# Patient Record
Sex: FEMALE | Employment: FULL TIME | URBAN - METROPOLITAN AREA
[De-identification: names, ages, dates, MRNs, and addresses within clinical notes are randomized per-mention and may not be internally consistent; named-entity substitution may affect disease eponyms.]

---

## 2017-07-28 ENCOUNTER — APPOINTMENT (OUTPATIENT)
Dept: LAB | Age: 22
End: 2017-07-28
Attending: INTERNAL MEDICINE
Payer: COMMERCIAL

## 2017-07-28 ENCOUNTER — OFFICE VISIT (OUTPATIENT)
Dept: INTERNAL MEDICINE CLINIC | Facility: CLINIC | Age: 22
End: 2017-07-28

## 2017-07-28 VITALS
HEART RATE: 96 BPM | TEMPERATURE: 98 F | HEIGHT: 59 IN | WEIGHT: 104 LBS | DIASTOLIC BLOOD PRESSURE: 69 MMHG | RESPIRATION RATE: 24 BRPM | SYSTOLIC BLOOD PRESSURE: 108 MMHG | BODY MASS INDEX: 20.96 KG/M2

## 2017-07-28 DIAGNOSIS — E55.9 VITAMIN D DEFICIENCY: ICD-10-CM

## 2017-07-28 DIAGNOSIS — Z11.1 SCREENING-PULMONARY TB: ICD-10-CM

## 2017-07-28 DIAGNOSIS — Z00.00 PHYSICAL EXAM, ANNUAL: Primary | ICD-10-CM

## 2017-07-28 DIAGNOSIS — Z13.9 SCREENING FOR CONDITION: ICD-10-CM

## 2017-07-28 PROCEDURE — 99395 PREV VISIT EST AGE 18-39: CPT | Performed by: INTERNAL MEDICINE

## 2017-07-28 RX ORDER — NORGESTIMATE AND ETHINYL ESTRADIOL 7DAYSX3 28
1 KIT ORAL
COMMUNITY
Start: 2017-03-23 | End: 2018-04-06

## 2017-07-28 NOTE — PROGRESS NOTES
HPI:    Patient ID: Keanu Momin is a 25year old female. HPI  Patient reports that she has been feeling well overall, she is going to attend nursing school, needs titers lab test done. Last Tdap was March 2017.   She had varicella titers done and they we Allergies:No Known Allergies   /69 (BP Location: Right arm, Patient Position: Sitting, Cuff Size: adult)   Pulse 96   Temp 98.1 °F (36.7 °C) (Oral)   Resp 24   Ht 4' 11\" (1.499 m)   Wt 104 lb (47.2 kg)   LMP 07/08/2017   BMI 21.01 kg/m²    Physi Hepatitis B Surface Antibody [E]      Quantiferon TB Gold (in Tube)    Meds This Visit:  No prescriptions requested or ordered in this encounter       Imaging & Referrals:  None         FRANCESCA#[id number]

## 2017-07-29 ENCOUNTER — LAB ENCOUNTER (OUTPATIENT)
Dept: LAB | Age: 22
End: 2017-07-29
Attending: INTERNAL MEDICINE
Payer: COMMERCIAL

## 2017-07-29 DIAGNOSIS — E55.9 VITAMIN D DEFICIENCY: ICD-10-CM

## 2017-07-29 DIAGNOSIS — Z11.1 SCREENING-PULMONARY TB: ICD-10-CM

## 2017-07-29 DIAGNOSIS — Z13.9 SCREENING FOR CONDITION: ICD-10-CM

## 2017-07-29 DIAGNOSIS — Z00.00 PHYSICAL EXAM, ANNUAL: ICD-10-CM

## 2017-07-29 LAB
ALBUMIN SERPL BCP-MCNC: 4 G/DL (ref 3.5–4.8)
ALBUMIN/GLOB SERPL: 1.1 {RATIO} (ref 1–2)
ALP SERPL-CCNC: 31 U/L (ref 32–100)
ALT SERPL-CCNC: 15 U/L (ref 14–54)
ANION GAP SERPL CALC-SCNC: 6 MMOL/L (ref 0–18)
AST SERPL-CCNC: 20 U/L (ref 15–41)
BASOPHILS # BLD: 0 K/UL (ref 0–0.2)
BASOPHILS NFR BLD: 0 %
BILIRUB SERPL-MCNC: 0.6 MG/DL (ref 0.3–1.2)
BUN SERPL-MCNC: 6 MG/DL (ref 8–20)
BUN/CREAT SERPL: 8.3 (ref 10–20)
CALCIUM SERPL-MCNC: 8.9 MG/DL (ref 8.5–10.5)
CHLORIDE SERPL-SCNC: 107 MMOL/L (ref 95–110)
CHOLEST SERPL-MCNC: 226 MG/DL (ref 110–200)
CO2 SERPL-SCNC: 24 MMOL/L (ref 22–32)
CREAT SERPL-MCNC: 0.72 MG/DL (ref 0.5–1.5)
EOSINOPHIL # BLD: 0.3 K/UL (ref 0–0.7)
EOSINOPHIL NFR BLD: 6 %
ERYTHROCYTE [DISTWIDTH] IN BLOOD BY AUTOMATED COUNT: 13.2 % (ref 11–15)
GLOBULIN PLAS-MCNC: 3.6 G/DL (ref 2.5–3.7)
GLUCOSE SERPL-MCNC: 86 MG/DL (ref 70–99)
HCT VFR BLD AUTO: 41.7 % (ref 35–48)
HDLC SERPL-MCNC: 77 MG/DL
HGB BLD-MCNC: 14.1 G/DL (ref 12–16)
LDLC SERPL CALC-MCNC: 126 MG/DL (ref 0–99)
LYMPHOCYTES # BLD: 1.8 K/UL (ref 1–4)
LYMPHOCYTES NFR BLD: 32 %
MCH RBC QN AUTO: 30.4 PG (ref 27–32)
MCHC RBC AUTO-ENTMCNC: 33.9 G/DL (ref 32–37)
MCV RBC AUTO: 89.6 FL (ref 80–100)
MONOCYTES # BLD: 0.5 K/UL (ref 0–1)
MONOCYTES NFR BLD: 8 %
NEUTROPHILS # BLD AUTO: 3.1 K/UL (ref 1.8–7.7)
NEUTROPHILS NFR BLD: 54 %
NONHDLC SERPL-MCNC: 149 MG/DL
OSMOLALITY UR CALC.SUM OF ELEC: 281 MOSM/KG (ref 275–295)
PLATELET # BLD AUTO: 250 K/UL (ref 140–400)
PMV BLD AUTO: 7.9 FL (ref 7.4–10.3)
POTASSIUM SERPL-SCNC: 3.9 MMOL/L (ref 3.3–5.1)
PROT SERPL-MCNC: 7.6 G/DL (ref 5.9–8.4)
RBC # BLD AUTO: 4.65 M/UL (ref 3.7–5.4)
RUBV IGG SER-ACNC: 121.1 IU/ML
SODIUM SERPL-SCNC: 137 MMOL/L (ref 136–144)
TRIGL SERPL-MCNC: 115 MG/DL (ref 1–149)
TSH SERPL-ACNC: 2.44 UIU/ML (ref 0.45–5.33)
WBC # BLD AUTO: 5.7 K/UL (ref 4–11)

## 2017-07-29 PROCEDURE — 80061 LIPID PANEL: CPT

## 2017-07-29 PROCEDURE — 36415 COLL VENOUS BLD VENIPUNCTURE: CPT

## 2017-07-29 PROCEDURE — 82306 VITAMIN D 25 HYDROXY: CPT

## 2017-07-29 PROCEDURE — 85025 COMPLETE CBC W/AUTO DIFF WBC: CPT

## 2017-07-29 PROCEDURE — 86765 RUBEOLA ANTIBODY: CPT

## 2017-07-29 PROCEDURE — 86762 RUBELLA ANTIBODY: CPT

## 2017-07-29 PROCEDURE — 86480 TB TEST CELL IMMUN MEASURE: CPT

## 2017-07-29 PROCEDURE — 84443 ASSAY THYROID STIM HORMONE: CPT

## 2017-07-29 PROCEDURE — 86706 HEP B SURFACE ANTIBODY: CPT

## 2017-07-29 PROCEDURE — 80053 COMPREHEN METABOLIC PANEL: CPT

## 2017-07-29 PROCEDURE — 86735 MUMPS ANTIBODY: CPT

## 2017-07-31 LAB
25(OH)D3 SERPL-MCNC: 13.8 NG/ML
HBV SURFACE AB SER-ACNC: 9.58 MIU/ML (ref ?–10)
HBV SURFACE AG SERPL QL IA: NONREACTIVE
M TB IFN-G CD4+ BCKGRND COR BLD-ACNC: -0 IU/ML
M TB IFN-G CD4+ T-CELLS BLD-ACNC: 0.02 IU/ML
M TB TUBERC IFN-G BLD QL: NEGATIVE
M TB TUBERC IGNF/MITOGEN IGNF CONTROL: 9.09 IU/ML
MEV IGG SER-ACNC: 77.1 AU/ML (ref 30–?)
MUV IGG SER IA-ACNC: 249 AU/ML (ref 11–?)

## 2017-08-03 ENCOUNTER — TELEPHONE (OUTPATIENT)
Dept: INTERNAL MEDICINE CLINIC | Facility: CLINIC | Age: 22
End: 2017-08-03

## 2017-08-03 NOTE — TELEPHONE ENCOUNTER
Patient needs vaccination with hepatitis B, 3 injections, and 0 month, 1 month in 6 months, she can make nursing appointment.

## 2017-08-04 ENCOUNTER — TELEPHONE (OUTPATIENT)
Dept: INTERNAL MEDICINE CLINIC | Facility: CLINIC | Age: 22
End: 2017-08-04

## 2017-08-04 ENCOUNTER — PATIENT MESSAGE (OUTPATIENT)
Dept: INTERNAL MEDICINE CLINIC | Facility: CLINIC | Age: 22
End: 2017-08-04

## 2017-08-04 DIAGNOSIS — E55.9 VITAMIN D DEFICIENCY: Primary | ICD-10-CM

## 2017-08-07 NOTE — TELEPHONE ENCOUNTER
From: Jm Araujo  To: Raine Carvajal MD  Sent: 8/4/2017 12:51 PM CDT  Subject: Non-Urgent Medical Question    Hi Dr. Greg Chen,    I noticed my BUN and BUN/Crea were lower than normal. Should I be concerned about my kidney function?

## 2017-08-15 RX ORDER — ERGOCALCIFEROL (VITAMIN D2) 1250 MCG
50000 CAPSULE ORAL WEEKLY
Qty: 24 CAPSULE | Refills: 0 | Status: SHIPPED | OUTPATIENT
Start: 2017-08-15 | End: 2018-01-24

## 2017-08-15 NOTE — TELEPHONE ENCOUNTER
LMTCB. Transfer to [id number].    ----- Message from Megan Baeza MD sent at 8/4/2017  7:29 AM CDT -----  Call patient if she did not check my chart see my chart comment  Result   HEPATITIS B SURFACE ANTIBODY (Order [id number])   Patient Result Comments     Reid Contreras
Pt calling back, she did see results on mycjudsont, pt asking about prescription for vitamin d. Order sent to pharmacy and lab generated for 6 months.
no

## 2018-02-15 RX ORDER — ERGOCALCIFEROL 1.25 MG/1
CAPSULE ORAL
Qty: 12 CAPSULE | Refills: 0 | OUTPATIENT
Start: 2018-02-15

## 2018-02-15 NOTE — TELEPHONE ENCOUNTER
Please call pt  She  Was advised to have  vit d  Level  Done , order placed in aug 2017  , can place new order  If needed,so we can determine if she needs to be treated with prescription vit D, call pharamcy and discontinue  Drisdol for now

## 2018-02-20 NOTE — TELEPHONE ENCOUNTER
From: Arti Panchal  Sent: 2/19/2018 11:32 PM CST  Subject: Medication Renewal Request    Arti Panchal would like a refill of the following medications:     Tretinoin 0.05 % Apply Externally Cream Chani Baker MD]   Patient Comment: Still using for acne. Norgestim-Eth Estrad Triphasic (TRI-SPRINTEC) 0.18/0.215/0.25 MG-35 MCG Oral Tab Chani Baker MD]   Patient Comment: Was last filled 11/29/17, I have 1 month supply left.     Preferred pharmacy: [address] [place] [address] [address][id number] - [address] [phone], [phone]

## 2018-02-21 RX ORDER — NORGESTIMATE AND ETHINYL ESTRADIOL 7DAYSX3 28
1 KIT ORAL DAILY
Qty: 28 TABLET | Refills: 1 | Status: SHIPPED | OUTPATIENT
Start: 2018-02-21 | End: 2018-04-06

## 2018-02-21 RX ORDER — TRETINOIN 0.5 MG/G
1 CREAM TOPICAL NIGHTLY
Qty: 45 G | Refills: 1 | Status: SHIPPED | OUTPATIENT
Start: 2018-02-21 | End: 2019-01-26

## 2018-02-21 NOTE — TELEPHONE ENCOUNTER
Call pt I refilled  BCP   For 2 months , she needs  f-up visit , it  Was  rxed by another provider  Before, please call her

## 2018-03-23 ENCOUNTER — TELEPHONE (OUTPATIENT)
Dept: INTERNAL MEDICINE CLINIC | Facility: CLINIC | Age: 23
End: 2018-03-23

## 2018-03-23 NOTE — TELEPHONE ENCOUNTER
Pt calling because she wants to know if Dr. Stanton Dale would like for her to get a lipid panel done. Pt states that the last time she had it done they were high.  Please advise

## 2018-03-26 ENCOUNTER — TELEPHONE (OUTPATIENT)
Dept: INTERNAL MEDICINE CLINIC | Facility: CLINIC | Age: 23
End: 2018-03-26

## 2018-03-26 DIAGNOSIS — E78.00 PURE HYPERCHOLESTEROLEMIA: Primary | ICD-10-CM

## 2018-03-27 NOTE — TELEPHONE ENCOUNTER
Called pt. Left message labs ordered , can do fasting per Dr. Christen Wolff. Pt. Can call back with questions.

## 2018-03-27 NOTE — TELEPHONE ENCOUNTER
I PLACED ORDER FOR LIPID PANEL , HER  NUMBERS WERE  FINE, I AM NOT  CONCERNED BUT SHE CAN DO FASTING  LABS

## 2018-03-30 ENCOUNTER — APPOINTMENT (OUTPATIENT)
Dept: LAB | Age: 23
End: 2018-03-30
Attending: INTERNAL MEDICINE
Payer: COMMERCIAL

## 2018-03-30 DIAGNOSIS — E78.00 PURE HYPERCHOLESTEROLEMIA: ICD-10-CM

## 2018-03-30 DIAGNOSIS — E55.9 VITAMIN D DEFICIENCY: ICD-10-CM

## 2018-03-30 PROCEDURE — 36415 COLL VENOUS BLD VENIPUNCTURE: CPT

## 2018-03-30 PROCEDURE — 80061 LIPID PANEL: CPT

## 2018-03-30 PROCEDURE — 82306 VITAMIN D 25 HYDROXY: CPT

## 2018-04-06 ENCOUNTER — OFFICE VISIT (OUTPATIENT)
Dept: INTERNAL MEDICINE CLINIC | Facility: CLINIC | Age: 23
End: 2018-04-06

## 2018-04-06 VITALS
SYSTOLIC BLOOD PRESSURE: 114 MMHG | WEIGHT: 106 LBS | HEART RATE: 71 BPM | HEIGHT: 59 IN | DIASTOLIC BLOOD PRESSURE: 66 MMHG | RESPIRATION RATE: 18 BRPM | BODY MASS INDEX: 21.37 KG/M2

## 2018-04-06 DIAGNOSIS — E55.9 VITAMIN D DEFICIENCY: ICD-10-CM

## 2018-04-06 DIAGNOSIS — S86.912A STRAIN OF LEFT KNEE, INITIAL ENCOUNTER: Primary | ICD-10-CM

## 2018-04-06 PROCEDURE — 99213 OFFICE O/P EST LOW 20 MIN: CPT | Performed by: INTERNAL MEDICINE

## 2018-04-06 PROCEDURE — 99212 OFFICE O/P EST SF 10 MIN: CPT | Performed by: INTERNAL MEDICINE

## 2018-04-06 RX ORDER — GLUCOSAMINE HCL 500 MG
1 TABLET ORAL DAILY
COMMUNITY
End: 2018-10-12

## 2018-04-06 RX ORDER — ERGOCALCIFEROL (VITAMIN D2) 1250 MCG
50000 CAPSULE ORAL WEEKLY
Qty: 30 CAPSULE | Refills: 0 | Status: SHIPPED | OUTPATIENT
Start: 2018-04-06 | End: 2018-12-06

## 2018-04-06 RX ORDER — NORGESTIMATE AND ETHINYL ESTRADIOL 7DAYSX3 28
1 KIT ORAL DAILY
Qty: 28 TABLET | Refills: 5 | Status: SHIPPED | OUTPATIENT
Start: 2018-04-06 | End: 2018-10-19

## 2018-04-08 NOTE — PROGRESS NOTES
HPI:    Patient ID: Kingsley Yuen is a 21year old female. Presents for evaluation of the knee pain    HPI     Patient reports that she has been bothered by  left knee pain localized over the medial aspect of the left kneecap.   Pain is started to subside, no well-developed and well-nourished. No distress. Musculoskeletal: Normal range of motion. She exhibits no effusion. Left knee: She exhibits normal range of motion, no swelling, no deformity and no erythema. Tenderness found.  Medial joint line and l

## 2018-10-05 ENCOUNTER — NURSE TRIAGE (OUTPATIENT)
Dept: OTHER | Age: 23
End: 2018-10-05

## 2018-10-05 NOTE — TELEPHONE ENCOUNTER
Appointment Information   Name: Uma Fuentes MRN: [id number]   Date: 10/5/2018 Status: Can   Appt Time: 4:30 PM Length: 15   Visit Type: EXAM - ESTABLISHED [2844] Copay: $0.00   Provider: Yashira Castro MD Department: [facility] MED   Referral Number:

## 2018-10-05 NOTE — TELEPHONE ENCOUNTER
Action Requested: Summary for Provider     []  Critical Lab, Recommendations Needed  [] Need Additional Advice  []   FYI    []   Need Orders  [] Need Medications Sent to Pharmacy  []  Other     SUMMARY: Patient requesting appt with NK today for persistent

## 2018-10-12 ENCOUNTER — OFFICE VISIT (OUTPATIENT)
Dept: INTERNAL MEDICINE CLINIC | Facility: CLINIC | Age: 23
End: 2018-10-12
Payer: COMMERCIAL

## 2018-10-12 VITALS
SYSTOLIC BLOOD PRESSURE: 112 MMHG | BODY MASS INDEX: 21 KG/M2 | RESPIRATION RATE: 20 BRPM | WEIGHT: 103 LBS | HEART RATE: 80 BPM | DIASTOLIC BLOOD PRESSURE: 70 MMHG

## 2018-10-12 DIAGNOSIS — J01.00 ACUTE MAXILLARY SINUSITIS, RECURRENCE NOT SPECIFIED: Primary | ICD-10-CM

## 2018-10-12 PROCEDURE — 99214 OFFICE O/P EST MOD 30 MIN: CPT | Performed by: INTERNAL MEDICINE

## 2018-10-12 PROCEDURE — 99212 OFFICE O/P EST SF 10 MIN: CPT | Performed by: INTERNAL MEDICINE

## 2018-10-12 RX ORDER — AMOXICILLIN AND CLAVULANATE POTASSIUM 875; 125 MG/1; MG/1
1 TABLET, FILM COATED ORAL 2 TIMES DAILY
Qty: 20 TABLET | Refills: 0 | Status: SHIPPED | OUTPATIENT
Start: 2018-10-12 | End: 2019-02-20 | Stop reason: ALTCHOICE

## 2018-10-12 NOTE — PROGRESS NOTES
HPI:    Patient ID: Carlos Muñoz is a 21year old female.   Presents for evaluation of the headache    HPI  2 weeks ago started to experience right-sided headache which starts from right cheek radiates up to the right temporal and right frontal area, pain is 1 capsule (50,000 Units total) by mouth once a week. Disp: 30 capsule Rfl: 0   Tretinoin 0.05 % External Cream Apply 1 Application topically nightly.  Disp: 45 g Rfl: 1     Allergies:No Known Allergies   /70 (BP Location: Right arm, Patient Position: Loratadine-Pseudoephedrine ER (CLARITIN-D 12 HOUR) 5-120 MG Oral Tablet 12 Hr 40 tablet 0     Sig: Take 1 tablet by mouth 2 (two) times daily.        Imaging & Referrals:  ENT - INTERNAL         EB#[id number]

## 2018-10-19 RX ORDER — NORGESTIMATE AND ETHINYL ESTRADIOL 7DAYSX3 28
KIT ORAL
Qty: 28 TABLET | Refills: 2 | Status: SHIPPED | OUTPATIENT
Start: 2018-10-19 | End: 2018-10-23

## 2018-10-24 RX ORDER — NORGESTIMATE AND ETHINYL ESTRADIOL 7DAYSX3 28
1 KIT ORAL
Qty: 84 TABLET | Refills: 1 | Status: SHIPPED | OUTPATIENT
Start: 2018-10-24 | End: 2019-04-07

## 2019-01-27 NOTE — TELEPHONE ENCOUNTER
Review pended refill request as it does not fall under a protocol.     Last Rx:2-21-18  LOV: 10-12-18

## 2019-02-20 ENCOUNTER — PATIENT MESSAGE (OUTPATIENT)
Dept: INTERNAL MEDICINE CLINIC | Facility: CLINIC | Age: 24
End: 2019-02-20

## 2019-02-20 ENCOUNTER — OFFICE VISIT (OUTPATIENT)
Dept: INTERNAL MEDICINE CLINIC | Facility: CLINIC | Age: 24
End: 2019-02-20
Payer: COMMERCIAL

## 2019-02-20 ENCOUNTER — APPOINTMENT (OUTPATIENT)
Dept: LAB | Age: 24
End: 2019-02-20
Attending: INTERNAL MEDICINE
Payer: COMMERCIAL

## 2019-02-20 VITALS
SYSTOLIC BLOOD PRESSURE: 114 MMHG | HEART RATE: 103 BPM | TEMPERATURE: 100 F | WEIGHT: 105 LBS | BODY MASS INDEX: 21.17 KG/M2 | HEIGHT: 59 IN | OXYGEN SATURATION: 98 % | DIASTOLIC BLOOD PRESSURE: 75 MMHG

## 2019-02-20 DIAGNOSIS — J06.9 UPPER RESPIRATORY TRACT INFECTION, UNSPECIFIED TYPE: ICD-10-CM

## 2019-02-20 DIAGNOSIS — R50.9 FEVER, UNSPECIFIED FEVER CAUSE: ICD-10-CM

## 2019-02-20 DIAGNOSIS — M79.10 MYALGIA: ICD-10-CM

## 2019-02-20 DIAGNOSIS — R50.9 FEVER, UNSPECIFIED FEVER CAUSE: Primary | ICD-10-CM

## 2019-02-20 PROCEDURE — 87581 M.PNEUMON DNA AMP PROBE: CPT

## 2019-02-20 PROCEDURE — 99212 OFFICE O/P EST SF 10 MIN: CPT | Performed by: INTERNAL MEDICINE

## 2019-02-20 PROCEDURE — 87486 CHLMYD PNEUM DNA AMP PROBE: CPT

## 2019-02-20 PROCEDURE — 87633 RESP VIRUS 12-25 TARGETS: CPT

## 2019-02-20 PROCEDURE — 99213 OFFICE O/P EST LOW 20 MIN: CPT | Performed by: INTERNAL MEDICINE

## 2019-02-20 PROCEDURE — 87798 DETECT AGENT NOS DNA AMP: CPT

## 2019-02-20 RX ORDER — AZITHROMYCIN 250 MG/1
TABLET, FILM COATED ORAL
Qty: 6 TABLET | Refills: 0 | Status: SHIPPED | OUTPATIENT
Start: 2019-02-20 | End: 2019-08-13

## 2019-02-20 NOTE — TELEPHONE ENCOUNTER
Patient has appointment for today with Dr. Marilin Delgado at 4:30 at [facility].

## 2019-02-20 NOTE — PROGRESS NOTES
Patient ID: Juan Min is a 25year old female. Patient presents with:  Fever: fever for the past three days body aches, runny nose, non-productive cough started yesterday. Taking tylenol for fever, last took some at noon today.         HISTORY OF PRESENT resource strain: Not on file      Food insecurity:        Worry: Not on file        Inability: Not on file      Transportation needs:        Medical: Not on file        Non-medical: Not on file    Tobacco Use      Smoking status: Never Smoker      [place] PHYSICAL EXAM:      02/20/19  1627   BP: 114/75   Pulse: 103   Temp: 100 °F (37.8 °C)   TempSrc: Oral   SpO2: 98%   Weight: 105 lb (47.6 kg)   Height: 4' 11\" (1.499 m)       Body mass index is 21.21 kg/m².     Physical Exam   Constitutional: She appears PANEL FLU EXPANDED; Future    3. Myalgia  · RESPIRATORY PANEL FLU EXPANDED; Future  · Plenty of fluids. Return if symptoms worsen or fail to improve.     Kaylie Muñoz MD  2/20/2019

## 2019-02-20 NOTE — PATIENT INSTRUCTIONS
1.  Take antibiotics as prescribed. 2.  Get labs done today. 3.  Symptomatic treatment with hot showers, steam inhalation, cough suppressants, decongestants. 4.  Okay to wear Breathe Right strip at night.

## 2019-02-20 NOTE — TELEPHONE ENCOUNTER
From: Marah Schirmer  To: Madelin Serrano MD  Sent: 2/20/2019 1:25 PM CST  Subject: Other    Hello,  Would Dr. Maria Del Carmen Ramsay be able to see me today or tomorrow? I have had a fever, body aches, headaches, and now a cough the past three days. I think I have the flu.  Pl

## 2019-02-22 ENCOUNTER — TELEPHONE (OUTPATIENT)
Dept: OTHER | Age: 24
End: 2019-02-22

## 2019-02-22 ENCOUNTER — PATIENT MESSAGE (OUTPATIENT)
Dept: INTERNAL MEDICINE CLINIC | Facility: CLINIC | Age: 24
End: 2019-02-22

## 2019-02-22 NOTE — TELEPHONE ENCOUNTER
Pt calling for flu swab result. I informed Pt I will call lab. Pt states she went to the [facility] lab on 2/20/19    Called Lab, spoke with Jefferson. Per Jefferson, test was canceled, and Blaze Dorsey informed Leopold Cheney, clinical staff, on 2/21/19.  Per Lizette, Pt was se

## 2019-02-22 NOTE — TELEPHONE ENCOUNTER
From: Bere Monique  To: Andi Carney MD  Sent: 2/22/2019 8:50 AM CST  Subject: Test Results Question    Hi Dr. Ernesto Vance,    Did the results from my flu screen come back? I went to the lab on same day as my appt, 2/20.     Thank you,    Bere Monique

## 2019-02-26 NOTE — TELEPHONE ENCOUNTER
Dr Geovanna Lyle, please advise. Patient sent email about flu results today--see her email, my response. Me   to Radha Garcia           2/26/19 1:39 PM   Pee Weber, I see you sent a message yesterday 2/25/19 and your message was sent to Dr Geovanna Lyle about this.

## 2019-02-27 NOTE — TELEPHONE ENCOUNTER
Pt called back stating she has never heard anything re lab for flu swab. Informed pt there was an error at the lab and no results because of that. Pt stated she feels better, but that she wished someone had reached out to her.  Informed of other RNs, MD and

## 2019-04-07 RX ORDER — NORGESTIMATE AND ETHINYL ESTRADIOL 7DAYSX3 28
KIT ORAL
Qty: 84 TABLET | Refills: 0 | Status: SHIPPED | OUTPATIENT
Start: 2019-04-07 | End: 2019-06-28

## 2019-06-28 NOTE — TELEPHONE ENCOUNTER
LMTCB=transfer to triage. Testin[employer] message sent. LR 4/7/19 and PCP wants to see her at the office prior to any refill again, as of this moment no OV schedule, no record of papsmear on file.      CSS=please call patient and assists with FU OV and then route

## 2019-06-28 NOTE — TELEPHONE ENCOUNTER
[facility] is calling states pt is out of her medication       Current Outpatient Medications:  TRI-LINYAH 0.18/0.215/0.25 MG-35 MCG Oral Tab Take 1 tablet by mouth once daily.  Disp: 84 tablet Rfl: 0

## 2019-06-29 RX ORDER — NORGESTIMATE AND ETHINYL ESTRADIOL 7DAYSX3 28
KIT ORAL
Qty: 84 TABLET | Refills: 0 | Status: SHIPPED | OUTPATIENT
Start: 2019-06-29 | End: 2019-09-21

## 2019-06-29 NOTE — TELEPHONE ENCOUNTER
Advised patient that needs office visit prior to any further refills. Warm transferred patient to phone room to schedule appointment. Patient has 1 week of birth control left. Please advise.

## 2019-08-13 ENCOUNTER — OFFICE VISIT (OUTPATIENT)
Dept: INTERNAL MEDICINE CLINIC | Facility: CLINIC | Age: 24
End: 2019-08-13
Payer: COMMERCIAL

## 2019-08-13 VITALS
BODY MASS INDEX: 22 KG/M2 | HEART RATE: 79 BPM | RESPIRATION RATE: 18 BRPM | TEMPERATURE: 98 F | WEIGHT: 108 LBS | DIASTOLIC BLOOD PRESSURE: 68 MMHG | SYSTOLIC BLOOD PRESSURE: 106 MMHG

## 2019-08-13 DIAGNOSIS — L68.0 HIRSUTISM: ICD-10-CM

## 2019-08-13 DIAGNOSIS — E55.9 VITAMIN D DEFICIENCY: ICD-10-CM

## 2019-08-13 DIAGNOSIS — Z00.00 PHYSICAL EXAM, ANNUAL: Primary | ICD-10-CM

## 2019-08-13 DIAGNOSIS — Z30.41 ENCOUNTER FOR SURVEILLANCE OF CONTRACEPTIVE PILLS: ICD-10-CM

## 2019-08-13 PROCEDURE — 99395 PREV VISIT EST AGE 18-39: CPT | Performed by: INTERNAL MEDICINE

## 2019-08-13 RX ORDER — SPIRONOLACTONE 25 MG/1
25 TABLET ORAL DAILY
Qty: 90 TABLET | Refills: 1 | Status: SHIPPED | OUTPATIENT
Start: 2019-08-13 | End: 2020-03-09

## 2019-08-14 NOTE — PROGRESS NOTES
HPI:    Patient ID: Desiree Anand is a 25year old female.   Presents for a physical exam    HPI  Patient reports that she has been feeling well, she has been taking birth control pills for several years to help acne, she is sexually active occasionally, she s Allergies:No Known Allergies   /68 (BP Location: Left arm, Patient Position: Sitting, Cuff Size: adult)   Pulse 79   Temp 98 °F (36.7 °C) (Oral)   Resp 18   Wt 108 lb (49 kg)   BMI 21.81 kg/m²    Physical Exam    Constitutional: She is oriented t smear  Orders Placed This Encounter      CBC W Differential W Platelet [E]      Comp Metabolic Panel (14) [E]      Testosterone,Free And Total [E]      TSH W Reflex To Free T4 [E]      Lipid Panel [E]      Vitamin D, 25-Hydroxy      Meds This Visit:  Reque

## 2019-08-20 ENCOUNTER — LAB ENCOUNTER (OUTPATIENT)
Dept: LAB | Age: 24
End: 2019-08-20
Attending: INTERNAL MEDICINE
Payer: COMMERCIAL

## 2019-08-20 DIAGNOSIS — E55.9 VITAMIN D DEFICIENCY: ICD-10-CM

## 2019-08-20 DIAGNOSIS — L68.0 HIRSUTISM: ICD-10-CM

## 2019-08-20 DIAGNOSIS — Z00.00 PHYSICAL EXAM, ANNUAL: ICD-10-CM

## 2019-08-20 LAB
ALBUMIN SERPL-MCNC: 4.1 G/DL (ref 3.4–5)
ALBUMIN/GLOB SERPL: 1 {RATIO} (ref 1–2)
ALP LIVER SERPL-CCNC: 38 U/L (ref 37–98)
ALT SERPL-CCNC: 18 U/L (ref 13–56)
ANION GAP SERPL CALC-SCNC: 11 MMOL/L (ref 0–18)
AST SERPL-CCNC: 19 U/L (ref 15–37)
BASOPHILS # BLD AUTO: 0.02 X10(3) UL (ref 0–0.2)
BASOPHILS NFR BLD AUTO: 0.3 %
BILIRUB SERPL-MCNC: 0.3 MG/DL (ref 0.1–2)
BUN BLD-MCNC: 7 MG/DL (ref 7–18)
BUN/CREAT SERPL: 9.3 (ref 10–20)
CALCIUM BLD-MCNC: 9.3 MG/DL (ref 8.5–10.1)
CHLORIDE SERPL-SCNC: 106 MMOL/L (ref 98–112)
CHOLEST SMN-MCNC: 218 MG/DL (ref ?–200)
CO2 SERPL-SCNC: 26 MMOL/L (ref 21–32)
CREAT BLD-MCNC: 0.75 MG/DL (ref 0.55–1.02)
DEPRECATED RDW RBC AUTO: 42.1 FL (ref 35.1–46.3)
EOSINOPHIL # BLD AUTO: 0.23 X10(3) UL (ref 0–0.7)
EOSINOPHIL NFR BLD AUTO: 3.7 %
ERYTHROCYTE [DISTWIDTH] IN BLOOD BY AUTOMATED COUNT: 12.8 % (ref 11–15)
GLOBULIN PLAS-MCNC: 4.1 G/DL (ref 2.8–4.4)
GLUCOSE BLD-MCNC: 89 MG/DL (ref 70–99)
HCT VFR BLD AUTO: 41 % (ref 35–48)
HDLC SERPL-MCNC: 79 MG/DL (ref 40–59)
HGB BLD-MCNC: 13.7 G/DL (ref 12–16)
IMM GRANULOCYTES # BLD AUTO: 0.02 X10(3) UL (ref 0–1)
IMM GRANULOCYTES NFR BLD: 0.3 %
LDLC SERPL CALC-MCNC: 124 MG/DL (ref ?–100)
LYMPHOCYTES # BLD AUTO: 2.25 X10(3) UL (ref 1–4)
LYMPHOCYTES NFR BLD AUTO: 36.4 %
M PROTEIN MFR SERPL ELPH: 8.2 G/DL (ref 6.4–8.2)
MCH RBC QN AUTO: 30.4 PG (ref 26–34)
MCHC RBC AUTO-ENTMCNC: 33.4 G/DL (ref 31–37)
MCV RBC AUTO: 90.9 FL (ref 80–100)
MONOCYTES # BLD AUTO: 0.43 X10(3) UL (ref 0.1–1)
MONOCYTES NFR BLD AUTO: 7 %
NEUTROPHILS # BLD AUTO: 3.23 X10 (3) UL (ref 1.5–7.7)
NEUTROPHILS # BLD AUTO: 3.23 X10(3) UL (ref 1.5–7.7)
NEUTROPHILS NFR BLD AUTO: 52.3 %
NONHDLC SERPL-MCNC: 139 MG/DL (ref ?–130)
OSMOLALITY SERPL CALC.SUM OF ELEC: 293 MOSM/KG (ref 275–295)
PATIENT FASTING: YES
PATIENT FASTING: YES
PLATELET # BLD AUTO: 276 10(3)UL (ref 150–450)
POTASSIUM SERPL-SCNC: 4.1 MMOL/L (ref 3.5–5.1)
RBC # BLD AUTO: 4.51 X10(6)UL (ref 3.8–5.3)
SODIUM SERPL-SCNC: 143 MMOL/L (ref 136–145)
TRIGL SERPL-MCNC: 75 MG/DL (ref 30–149)
TSI SER-ACNC: 3.14 MIU/ML (ref 0.36–3.74)
VLDLC SERPL CALC-MCNC: 15 MG/DL (ref 0–30)
WBC # BLD AUTO: 6.2 X10(3) UL (ref 4–11)

## 2019-08-20 PROCEDURE — 80053 COMPREHEN METABOLIC PANEL: CPT

## 2019-08-20 PROCEDURE — 36415 COLL VENOUS BLD VENIPUNCTURE: CPT

## 2019-08-20 PROCEDURE — 84403 ASSAY OF TOTAL TESTOSTERONE: CPT

## 2019-08-20 PROCEDURE — 84443 ASSAY THYROID STIM HORMONE: CPT

## 2019-08-20 PROCEDURE — 82306 VITAMIN D 25 HYDROXY: CPT

## 2019-08-20 PROCEDURE — 85025 COMPLETE CBC W/AUTO DIFF WBC: CPT

## 2019-08-20 PROCEDURE — 84402 ASSAY OF FREE TESTOSTERONE: CPT

## 2019-08-20 PROCEDURE — 80061 LIPID PANEL: CPT

## 2019-08-21 LAB — 25(OH)D3 SERPL-MCNC: 16.1 NG/ML (ref 30–100)

## 2019-08-24 LAB
TESTOSTERONE, FREE, S: 0.25 NG/DL
TESTOSTERONE, TOTAL, S: 36 NG/DL

## 2019-09-22 RX ORDER — NORGESTIMATE AND ETHINYL ESTRADIOL 7DAYSX3 28
KIT ORAL
Qty: 84 TABLET | Refills: 0 | Status: SHIPPED | OUTPATIENT
Start: 2019-09-22 | End: 2019-12-09

## 2019-09-22 NOTE — TELEPHONE ENCOUNTER
Please review; protocol failed.     LOV-08/13/19    ASSESSMENT/PLAN:     Acne controlled continue of BCP, Retin-A cream .  Advised patient to see gynecologist for a Pap smear  Requested Prescriptions   Pending Prescriptions Disp Refills   • [address] 0.18/

## 2019-09-22 NOTE — TELEPHONE ENCOUNTER
Please call patient I refilled medication of control pills for 3 months she needs to see gynecologist or come back and see me for a Pap smear no more refills until Pap smear completed

## 2019-12-09 RX ORDER — NORGESTIMATE AND ETHINYL ESTRADIOL 7DAYSX3 28
KIT ORAL
Qty: 84 TABLET | Refills: 1 | Status: SHIPPED | OUTPATIENT
Start: 2019-12-09

## 2019-12-10 NOTE — TELEPHONE ENCOUNTER
Please call patient that I refilled birth control pills for 3 months she needs to see the gynecologist to come and see me for a Pap smear no more refills will be given

## 2019-12-10 NOTE — TELEPHONE ENCOUNTER
Please review; protocol failed.     Requested Prescriptions     Pending Prescriptions Disp Refills   • [address] 0.18/0.215/0.25 MG-35 MCG Oral Tab [Pharmacy Med Name: Margarita Sotoarch 0.18/0.215/0.25 Mg-35 Mcg Tab Nort] 84 tablet 0     Sig: TAKE ONE TABLET BY M

## 2019-12-12 NOTE — TELEPHONE ENCOUNTER
Advised patient of Dr Monica Gilbert   note. Patient verbalized understanding. Needs pap smear to see Gyne or make appointment with Dr. Monica Gilbert. No further refills until pap smear completed.

## 2020-03-09 RX ORDER — ERGOCALCIFEROL 1.25 MG/1
CAPSULE ORAL
Qty: 12 CAPSULE | Refills: 0 | Status: SHIPPED | OUTPATIENT
Start: 2020-03-09 | End: 2020-10-09

## 2020-03-09 RX ORDER — SPIRONOLACTONE 25 MG/1
25 TABLET ORAL DAILY
Qty: 90 TABLET | Refills: 1 | Status: SHIPPED | OUTPATIENT
Start: 2020-03-09 | End: 2020-10-09

## 2020-03-09 NOTE — TELEPHONE ENCOUNTER
Review pended refill request as it does not fall under a protocol. Last Rx: 08/21/19  LOV: 08/13/19    Last Vitamin D level was 16.1 on 08/20/19    Refill passed per [facility] protocol.       Hypertensive Medications  Protocol Criteria:  · Appointm

## 2020-03-23 ENCOUNTER — NURSE TRIAGE (OUTPATIENT)
Dept: OTHER | Age: 25
End: 2020-03-23

## 2020-03-23 NOTE — TELEPHONE ENCOUNTER
Spoke to patient, reviewed her symptoms, she states that it is mainly congestion in her head and mucus in the throat and nose, no symptoms in her chest.  Advised her to wait 3 to 4 days and see how she does sounds like she has probably viral symptoms.   If

## 2020-05-31 DIAGNOSIS — E55.9 VITAMIN D DEFICIENCY: Primary | ICD-10-CM

## 2020-05-31 RX ORDER — ERGOCALCIFEROL 1.25 MG/1
CAPSULE ORAL
Qty: 12 CAPSULE | Refills: 0 | OUTPATIENT
Start: 2020-05-31

## 2020-06-01 NOTE — TELEPHONE ENCOUNTER
Please call patient that she should have vitamin D level tested I will place order, and meanwhile she should take over-the-counter vitamin D3 1000 units daily indefinitely to maintain  good level until vitamin D level can be done

## 2020-06-03 NOTE — TELEPHONE ENCOUNTER
Patient returned our call, informed  of Dr. Vikas Krishna  instructions below   Provided info to call Central scheduling at [phone] for lab appt. Patient verbalizes understanding and agrees.

## 2020-10-08 ENCOUNTER — TELEPHONE (OUTPATIENT)
Dept: INTERNAL MEDICINE CLINIC | Facility: CLINIC | Age: 25
End: 2020-10-08

## 2020-10-08 NOTE — TELEPHONE ENCOUNTER
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 Pt denies numbness, tingling, weakness. Pt states looks slightly swollen. Pt states able to ambulate without difficulty. Advised OV and pt agrees to plan. Pt prefers to schedule with Dr Jojo Conklin and no openings until 10/13/20. [id number] Sharyn Martin for pt to wait or needs sooner OV.

## 2020-10-08 NOTE — TELEPHONE ENCOUNTER
Pt sent message via [employer] stating Hi Dr. Jojo Conklin, I am experiencing a very sharp pain in my right knee when I walk. Can we E-visit?  Please advise

## 2020-10-09 ENCOUNTER — OFFICE VISIT (OUTPATIENT)
Dept: INTERNAL MEDICINE CLINIC | Facility: CLINIC | Age: 25
End: 2020-10-09
Payer: COMMERCIAL

## 2020-10-09 ENCOUNTER — LAB ENCOUNTER (OUTPATIENT)
Dept: LAB | Age: 25
End: 2020-10-09
Attending: INTERNAL MEDICINE
Payer: COMMERCIAL

## 2020-10-09 ENCOUNTER — HOSPITAL ENCOUNTER (OUTPATIENT)
Dept: GENERAL RADIOLOGY | Age: 25
Discharge: HOME | End: 2020-10-09
Attending: INTERNAL MEDICINE
Payer: COMMERCIAL

## 2020-10-09 VITALS
SYSTOLIC BLOOD PRESSURE: 111 MMHG | BODY MASS INDEX: 22 KG/M2 | DIASTOLIC BLOOD PRESSURE: 71 MMHG | HEART RATE: 82 BPM | RESPIRATION RATE: 20 BRPM | WEIGHT: 110 LBS

## 2020-10-09 DIAGNOSIS — Z00.00 PHYSICAL EXAM, ANNUAL: ICD-10-CM

## 2020-10-09 DIAGNOSIS — M25.561 ACUTE PAIN OF RIGHT KNEE: ICD-10-CM

## 2020-10-09 DIAGNOSIS — M25.561 ACUTE PAIN OF RIGHT KNEE: Primary | ICD-10-CM

## 2020-10-09 DIAGNOSIS — E55.9 VITAMIN D DEFICIENCY: ICD-10-CM

## 2020-10-09 PROCEDURE — 99213 OFFICE O/P EST LOW 20 MIN: CPT | Performed by: INTERNAL MEDICINE

## 2020-10-09 PROCEDURE — 80053 COMPREHEN METABOLIC PANEL: CPT

## 2020-10-09 PROCEDURE — 84443 ASSAY THYROID STIM HORMONE: CPT

## 2020-10-09 PROCEDURE — 82306 VITAMIN D 25 HYDROXY: CPT

## 2020-10-09 PROCEDURE — 73562 X-RAY EXAM OF KNEE 3: CPT | Performed by: INTERNAL MEDICINE

## 2020-10-09 PROCEDURE — 85025 COMPLETE CBC W/AUTO DIFF WBC: CPT

## 2020-10-09 PROCEDURE — 3078F DIAST BP <80 MM HG: CPT | Performed by: INTERNAL MEDICINE

## 2020-10-09 PROCEDURE — 80061 LIPID PANEL: CPT

## 2020-10-09 PROCEDURE — 3074F SYST BP LT 130 MM HG: CPT | Performed by: INTERNAL MEDICINE

## 2020-10-09 PROCEDURE — 36415 COLL VENOUS BLD VENIPUNCTURE: CPT

## 2020-10-09 RX ORDER — IBUPROFEN 600 MG/1
600 TABLET ORAL EVERY 8 HOURS PRN
Qty: 30 TABLET | Refills: 0 | Status: SHIPPED | OUTPATIENT
Start: 2020-10-09 | End: 2020-11-09

## 2020-10-10 NOTE — PROGRESS NOTES
HPI:    Patient ID: Jacqlzulema Griffith is a 22year old female. Presents for evaluation of the knee pain    HPI  About 2 weeks ago patient started to experience right knee pain, associated with swelling, prolonged walking aggravates the pain, no obvious injury. normal.              ASSESSMENT/PLAN:   Acute pain of right knee  (primary encounter diagnosis) get x-ray of the right knee, start ibuprofen 600 mg every 8 hours for 7 to 10 days, wear support sleeve during work hours, consider orthopedic evaluation, repor

## 2020-11-09 ENCOUNTER — OFFICE VISIT (OUTPATIENT)
Dept: INTERNAL MEDICINE CLINIC | Facility: CLINIC | Age: 25
End: 2020-11-09
Payer: COMMERCIAL

## 2020-11-09 VITALS
WEIGHT: 110.19 LBS | HEIGHT: 59 IN | BODY MASS INDEX: 22.21 KG/M2 | DIASTOLIC BLOOD PRESSURE: 73 MMHG | SYSTOLIC BLOOD PRESSURE: 109 MMHG | TEMPERATURE: 99 F | HEART RATE: 87 BPM

## 2020-11-09 DIAGNOSIS — Z12.4 SCREENING FOR CERVICAL CANCER: ICD-10-CM

## 2020-11-09 DIAGNOSIS — D89.2 SERUM GAMMA GLOBULIN INCREASED: ICD-10-CM

## 2020-11-09 DIAGNOSIS — Z00.00 PHYSICAL EXAM: Primary | ICD-10-CM

## 2020-11-09 DIAGNOSIS — E78.00 ELEVATED LDL CHOLESTEROL LEVEL: ICD-10-CM

## 2020-11-09 DIAGNOSIS — L70.0 ACNE VULGARIS: ICD-10-CM

## 2020-11-09 PROCEDURE — 3008F BODY MASS INDEX DOCD: CPT | Performed by: PHYSICIAN ASSISTANT

## 2020-11-09 PROCEDURE — 3078F DIAST BP <80 MM HG: CPT | Performed by: PHYSICIAN ASSISTANT

## 2020-11-09 PROCEDURE — 3074F SYST BP LT 130 MM HG: CPT | Performed by: PHYSICIAN ASSISTANT

## 2020-11-09 PROCEDURE — 99395 PREV VISIT EST AGE 18-39: CPT | Performed by: PHYSICIAN ASSISTANT

## 2020-11-09 PROCEDURE — 99072 ADDL SUPL MATRL&STAF TM PHE: CPT | Performed by: PHYSICIAN ASSISTANT

## 2020-11-09 RX ORDER — ERGOCALCIFEROL 1.25 MG/1
50000 CAPSULE ORAL
Qty: 12 CAPSULE | Refills: 0 | Status: SHIPPED | OUTPATIENT
Start: 2020-11-09 | End: 2021-03-15

## 2020-11-09 NOTE — PROGRESS NOTES
HPI:    Patient ID: Thierno Chong is a 22year old female. HPI   Patient presents for a physical exam and to discuss lab results. Over all feeling well. Had a pap in 2019 august which was normal. Currently on OCP for acne control.  Would like to see a derma kg/m². Physical Exam    Constitutional: She is oriented to person, place, and time and thin. She appears well-developed and well-nourished. HENT:   Head: Normocephalic and atraumatic.    Right Ear: Tympanic membrane normal.   Left Ear: Tympanic membrane Lipid Panel [E]      Comp Metabolic Panel (14) [E]    No follow-ups on file.     Meds This Visit:  Requested Prescriptions     Signed Prescriptions Disp Refills   • ergocalciferol 1.25 MG (24201 UT) Oral Cap 12 capsule 0     Sig: Take 1 capsule (50,000 Unit

## 2020-12-16 ENCOUNTER — MED REC SCAN ONLY (OUTPATIENT)
Dept: INTERNAL MEDICINE CLINIC | Facility: CLINIC | Age: 25
End: 2020-12-16

## 2021-03-15 ENCOUNTER — PATIENT MESSAGE (OUTPATIENT)
Dept: INTERNAL MEDICINE CLINIC | Facility: CLINIC | Age: 26
End: 2021-03-15

## 2021-03-15 RX ORDER — ERGOCALCIFEROL 1.25 MG/1
50000 CAPSULE ORAL
Qty: 12 CAPSULE | Refills: 0 | Status: SHIPPED | OUTPATIENT
Start: 2021-03-15 | End: 2021-06-01

## 2021-03-16 NOTE — TELEPHONE ENCOUNTER
From: Almaz Kirkland  To: Marlene Schaefer MD  Sent: 3/15/2021 12:35 PM CDT  Subject: Prescription Question    Hi Dr. Hali Hernandez,   I wanted to ask if I could have a prescription for Phexxi, the vaginal gel birth control used before sex.  I would like to use it in add

## 2021-03-22 RX ORDER — LACTIC ACID, L-, CITRIC ACID MONOHYDRATE, AND POTASSIUM BITARTRATE 90; 50; 20 MG/5G; MG/5G; MG/5G
GEL VAGINAL
Refills: 0 | Status: CANCELLED | OUTPATIENT
Start: 2021-03-22

## 2021-07-05 ENCOUNTER — PATIENT MESSAGE (OUTPATIENT)
Dept: FAMILY MEDICINE CLINIC | Facility: CLINIC | Age: 26
End: 2021-07-05

## 2021-07-06 NOTE — TELEPHONE ENCOUNTER
From: Jm Araujo  To: Boni Mendoza MD  Sent: 7/5/2021 5:45 PM CDT  Subject: Other    Hi! This message is meant for Dr. Darrell Negron who saw me in her office for a physical in November 2020.     If possible, would Dr. Chino Nicely be able to fill out this physical

## 2021-07-13 ENCOUNTER — TELEPHONE (OUTPATIENT)
Dept: INTERNAL MEDICINE CLINIC | Facility: CLINIC | Age: 26
End: 2021-07-13

## 2021-07-13 DIAGNOSIS — Z13.9 SCREENING FOR CONDITION: ICD-10-CM

## 2021-07-13 DIAGNOSIS — Z11.1 SCREENING-PULMONARY TB: Primary | ICD-10-CM

## 2021-07-17 ENCOUNTER — LAB ENCOUNTER (OUTPATIENT)
Dept: LAB | Age: 26
End: 2021-07-17
Attending: INTERNAL MEDICINE
Payer: COMMERCIAL

## 2021-07-17 DIAGNOSIS — Z13.9 SCREENING FOR CONDITION: ICD-10-CM

## 2021-07-17 DIAGNOSIS — Z11.1 SCREENING-PULMONARY TB: ICD-10-CM

## 2021-07-17 LAB
HBV SURFACE AB SER QL: REACTIVE
HBV SURFACE AB SERPL IA-ACNC: >1000 MIU/ML

## 2021-07-17 PROCEDURE — 86480 TB TEST CELL IMMUN MEASURE: CPT

## 2021-07-17 PROCEDURE — 36415 COLL VENOUS BLD VENIPUNCTURE: CPT

## 2021-07-17 PROCEDURE — 86706 HEP B SURFACE ANTIBODY: CPT

## 2021-07-19 LAB
M TB IFN-G CD4+ T-CELLS BLD-ACNC: 0.03 IU/ML
M TB TUBERC IFN-G BLD QL: NEGATIVE
M TB TUBERC IGNF/MITOGEN IGNF CONTROL: >10 IU/ML
QFT TB1 AG MINUS NIL: 0.02 IU/ML
QFT TB2 AG MINUS NIL: 0.01 IU/ML

## 2022-06-12 NOTE — TELEPHONE ENCOUNTER
11-Jun-2022 22:31 Action Requested: Summary for Provider     []  Critical Lab, Recommendations Needed  [x] Need Additional Advice  []   FYI    []   Need Orders  [x] Need Medications Sent to Pharmacy  []  Other     SUMMARY:     Per the system protocol please contact patient

## (undated) NOTE — LETTER
03/19/18        [facility] [address]      Dear Seema Dominique,    Our records indicate that you have outstanding lab work and or testing that was ordered for you and has not yet been completed:          Vitamin D, 25-Hydroxy [E]  To pro